# Patient Record
Sex: FEMALE | Race: WHITE
[De-identification: names, ages, dates, MRNs, and addresses within clinical notes are randomized per-mention and may not be internally consistent; named-entity substitution may affect disease eponyms.]

---

## 2018-03-01 ENCOUNTER — HOSPITAL ENCOUNTER (EMERGENCY)
Dept: HOSPITAL 13 - EME | Age: 23
Discharge: HOME | End: 2018-03-01
Payer: COMMERCIAL

## 2018-03-01 VITALS — DIASTOLIC BLOOD PRESSURE: 56 MMHG | SYSTOLIC BLOOD PRESSURE: 115 MMHG

## 2018-03-01 VITALS — HEIGHT: 68 IN | BODY MASS INDEX: 28.43 KG/M2 | WEIGHT: 187.61 LBS

## 2018-03-01 DIAGNOSIS — R00.0: ICD-10-CM

## 2018-03-01 DIAGNOSIS — K52.9: Primary | ICD-10-CM

## 2018-03-01 LAB
ALBUMIN SERPL-MCNC: 4.5 G/DL (ref 3.2–4.8)
ALP SERPL-CCNC: 59 IU/L (ref 3–129)
ALT (GPT): 10 IU/L (ref 3–49)
ANA TITR SER IF: (no result) /HPF (ref 0–5)
AST (GOT): 15 IU/L (ref 2–34)
BACTERIA #/AREA URNS HPF: (no result) /HPF
BASOPHILS # BLD AUTO: 13.4 G/DL (ref 11.9–15.5)
BILIRUBIN: NEGATIVE
BLOOD: (no result)
CHLORIDE: 105 MEQ/L (ref 99–109)
COLOR UR: YELLOW
CREATININE: 0.7 MG/DL (ref 0.6–1.3)
D DIMER PPP FEU-MCNC: NEGATIVE
EPITHELIAL CELLS: (no result) /HPF
GFR SERPLBLD CREATININE-BSD FMLAMALE: > 59 ML/MIN/
GLUCOSE (STRIP): NEGATIVE
GLUCOSE SERPL-MCNC: 122 MG/DL (ref 70–99)
HCO3 BLD-SCNC: 28 MEQ/L (ref 20–31)
HCT VFR BLD CALC: 39.4 % (ref 36–46)
IRON SERPL-MCNC: NEGATIVE UG/DL
LDLC SERPL DIRECT ASSAY-MCNC: CLEAR MG/DL
LIPASE SERPL-CCNC: 18 U/L (ref 1–51)
MCH RBC QN AUTO: 31.5 PG (ref 29–34)
MCV: 92.7 FL (ref 83–99)
MONOCYTES # BLD MANUAL: 34 G/DL (ref 30–36)
MUCOUS THREADS #/AREA URNS LPF: (no result) /LPF
NITRITE: NEGATIVE
NRBC (%): 0 /100 WBC (ref 0–0)
PLATELET COUNT: 276 K/UL (ref 156–360)
POTASSIUM SERPL-SCNC: 4.1 MEQ/L (ref 3.7–5.4)
QUANTITATIVE HCG: < 4 MIU/ML
RBC # BLD AUTO: 4.25 M/UL (ref 3.8–5.2)
RBC DIS.WIDTH-CV: 12 % (ref 11.8–14.6)
RBC DIS.WIDTH-SD: 40.9 % (ref 39–53)
RED BLOOD CELLS: (no result) /HPF (ref 0–5)
SODIUM: 141 MEQ/L (ref 136–147)
SPECIFIC GRAVITY: 1.01 (ref 1–1.03)
TOTAL BILIRUBIN: 0.8 MG/DL (ref 0–1)
TOTAL PROTEIN: 6.9 G/DL (ref 6.4–8.3)
UCUL ADDED?: NO
UREA NITROGEN (BUN): 15 MG/DL (ref 9–23)
UROBILINOGEN UR-MCNC: 0.2 MG/DL (ref 0.2–1)
WBC # BLD AUTO: 11.2 K/UL (ref 4.1–10.2)
WBC # BLD AUTO: NEGATIVE 10*3/UL

## 2024-10-19 ENCOUNTER — OFFICE VISIT (OUTPATIENT)
Dept: URGENT CARE | Facility: CLINIC | Age: 29
End: 2024-10-19
Payer: COMMERCIAL

## 2024-10-19 VITALS
HEART RATE: 77 BPM | DIASTOLIC BLOOD PRESSURE: 59 MMHG | OXYGEN SATURATION: 100 % | SYSTOLIC BLOOD PRESSURE: 123 MMHG | TEMPERATURE: 97.9 F

## 2024-10-19 DIAGNOSIS — J02.0 STREP THROAT: Primary | ICD-10-CM

## 2024-10-19 LAB — S PYO AG THROAT QL: POSITIVE

## 2024-10-19 PROCEDURE — G0382 LEV 3 HOSP TYPE B ED VISIT: HCPCS | Performed by: PHYSICIAN ASSISTANT

## 2024-10-19 PROCEDURE — 87880 STREP A ASSAY W/OPTIC: CPT | Performed by: PHYSICIAN ASSISTANT

## 2024-10-19 RX ORDER — FLUCONAZOLE 150 MG/1
150 TABLET ORAL ONCE
Qty: 1 TABLET | Refills: 0 | Status: SHIPPED | OUTPATIENT
Start: 2024-10-19 | End: 2024-10-19

## 2024-10-19 RX ORDER — AMOXICILLIN 500 MG/1
500 TABLET, FILM COATED ORAL 2 TIMES DAILY
Qty: 20 TABLET | Refills: 0 | Status: SHIPPED | OUTPATIENT
Start: 2024-10-19 | End: 2024-10-29

## 2024-10-19 RX ORDER — SERTRALINE HYDROCHLORIDE 100 MG/1
100 TABLET, FILM COATED ORAL DAILY
COMMUNITY

## 2024-10-19 NOTE — PATIENT INSTRUCTIONS
Rapid strep a positive, will treat with 10-day course of amoxicillin.  Placed prescription for Diflucan to be taken if symptoms of yeast infection start.  All patient's questions answered and is agreeable with this plan.

## 2024-10-19 NOTE — PROGRESS NOTES
[facility]        NAME: Denae Goff is a 29 y.o. female  : 1995    MRN: [id number]  DATE: 2024  TIME: 6:03 PM    Assessment and Plan   Strep throat [J02.0]  1. Strep throat  POCT rapid strepA    amoxicillin (AMOXIL) 500 MG tablet    fluconazole (DIFLUCAN) 150 mg tablet            Patient Instructions     Patient Instructions   Rapid strep a positive, will treat with 10-day course of amoxicillin.  Placed prescription for Diflucan to be taken if symptoms of yeast infection start.  All patient's questions answered and is agreeable with this plan.      Follow up with PCP in 3-5 days.  Proceed to  ER if symptoms worsen.    Chief Complaint     Chief Complaint   Patient presents with    Cold Like Symptoms     Sore throat, started today along with a cough, feels like razor blades swallowing, lymph nodes hurt, son has strep throat         History of Present Illness       Patient is a 29-year-old female presenting today with sore throat x 1 day.  Patient notes her son is at home currently being treated for strep throat.  Notes last night she started with a dry scratchy throat and awoke this morning with worsening pain and discomfort, now has discomfort when eating and swallowing, has not taken any medication limiting factors for her symptoms.  Had strep throat last year with a similar presentation.  Denies fever, trouble swallowing, drooling, voice change.        Review of Systems   Review of Systems   Constitutional:  Negative for chills and fever.   HENT:  Positive for sore throat. Negative for ear pain.    Eyes:  Negative for pain and visual disturbance.   Respiratory:  Negative for cough and shortness of breath.    Cardiovascular:  Negative for chest pain and palpitations.   Gastrointestinal:  Negative for abdominal pain and vomiting.   Genitourinary:  Negative for dysuria and hematuria.   Musculoskeletal:  Negative for arthralgias and back pain.   Skin:  Negative for color change and  rash.   Neurological:  Negative for seizures and syncope.   All other systems reviewed and are negative.        Current Medications       Current Outpatient Medications:     amoxicillin (AMOXIL) 500 MG tablet, Take 1 tablet (500 mg total) by mouth 2 (two) times a day for 10 days, Disp: 20 tablet, Rfl: 0    fluconazole (DIFLUCAN) 150 mg tablet, Take 1 tablet (150 mg total) by mouth once for 1 dose, Disp: 1 tablet, Rfl: 0    Omeprazole 20 MG TBDD, Take by mouth, Disp: , Rfl:     sertraline (ZOLOFT) 100 mg tablet, Take 100 mg by mouth daily, Disp: , Rfl:     Current Allergies     Allergies as of 10/19/2024    (No Known Allergies)            The following portions of the patient's history were reviewed and updated as appropriate: allergies, current medications, past family history, past medical history, past social history, past surgical history and problem list.     History reviewed. No pertinent past medical history.    History reviewed. No pertinent surgical history.    History reviewed. No pertinent family history.      Medications have been verified.        Objective   /59   Pulse 77   Temp 97.9 °F (36.6 °C)   SpO2 100%        Physical Exam     Physical Exam  Vitals and nursing note reviewed.   Constitutional:       General: She is not in acute distress.  HENT:      Head: Normocephalic.      Right Ear: Tympanic membrane, ear canal and external ear normal.      Left Ear: Tympanic membrane, ear canal and external ear normal.      Nose: Nose normal.      Mouth/Throat:      Mouth: Mucous membranes are moist.      Pharynx: Posterior oropharyngeal erythema present. No oropharyngeal exudate.      Tonsils: No tonsillar exudate. 1+ on the right. 1+ on the left.      Comments: Palatal petechia present  Cardiovascular:      Rate and Rhythm: Normal rate.      Pulses: Normal pulses.   Pulmonary:      Effort: Pulmonary effort is normal.   Lymphadenopathy:      Cervical: Cervical adenopathy present.   Skin:     General:  Skin is warm.      Capillary Refill: Capillary refill takes less than 2 seconds.   Neurological:      General: No focal deficit present.      Mental Status: She is alert and oriented to person, place, and time.